# Patient Record
(demographics unavailable — no encounter records)

---

## 2025-05-02 NOTE — ASSESSMENT
[FreeTextEntry1] : 17-year-old male here accompanied by his parents who presents today ration for left hamstring injury.  Patient reports he was running to first [place] when he felt a pull/pop to the left hamstring region.  He reports pain and difficulty with ambulation since time of injury.  Physical examination left hamstring: Mild swelling.  Mild mild ecchymosis.  No erythema.  Tender to palpation over the distal hamstring musculature.  Full range of motion at the hip and the knee.  Antalgic gait.  Calf soft and nontender.  Sensation intact distally.  Neuro vas intact.  X-rays left femur taken in the office today:  No acute fractures, subluxations, or dislocations.  Treatment plan as discussed:  My clinical suspicion is high for a strain of the left hamstring musculature given the patient's history, physical examination findings, x-ray findings.  The patient understands that bone contusions can take up to 6 weeks to fully heal.  He also understands that the 1st 2 weeks of the worst in terms of pain and swelling.   I recommended anti-inflammatory medication. Patient agrees to taking Advil/Ibuprofen OTC as needed for pain. Benefits discussed. Confirmed no contraindication to NSAIDs.  I recommended patient rest, ice, compress, and elevate the left lower extremity regularly. Encouraged activity modification as tolerable. Encouraged gentle range of motion to avoid stiffness. No gym /sports at least until further evaluation. The patient may bear weight as tolerated.  All questions and concerns addressed to patient's satisfaction. Patient expresses full understanding of treatment plan. Patient will follow up in 2 to 3 weeks with our sports surgeon for repeat evaluation and treatment.